# Patient Record
Sex: MALE | Race: WHITE | NOT HISPANIC OR LATINO | URBAN - METROPOLITAN AREA
[De-identification: names, ages, dates, MRNs, and addresses within clinical notes are randomized per-mention and may not be internally consistent; named-entity substitution may affect disease eponyms.]

---

## 2022-11-16 ENCOUNTER — APPOINTMENT (EMERGENCY)
Dept: CT IMAGING | Facility: HOSPITAL | Age: 57
End: 2022-11-16

## 2022-11-16 ENCOUNTER — HOSPITAL ENCOUNTER (INPATIENT)
Facility: HOSPITAL | Age: 57
LOS: 1 days | Discharge: HOME | End: 2022-11-17
Attending: EMERGENCY MEDICINE | Admitting: SURGERY

## 2022-11-16 DIAGNOSIS — K56.609 SBO (SMALL BOWEL OBSTRUCTION) (HCC): Primary | ICD-10-CM

## 2022-11-16 DIAGNOSIS — R10.9 ABDOMINAL PAIN: ICD-10-CM

## 2022-11-16 LAB
ALBUMIN SERPL BCP-MCNC: 3.9 G/DL (ref 3.5–5)
ALP SERPL-CCNC: 107 U/L (ref 46–116)
ALT SERPL W P-5'-P-CCNC: 43 U/L (ref 12–78)
ANION GAP SERPL CALCULATED.3IONS-SCNC: 9 MMOL/L (ref 4–13)
BASOPHILS # BLD AUTO: 0.03 THOUSANDS/ÂΜL (ref 0–0.1)
BASOPHILS NFR BLD AUTO: 0 % (ref 0–1)
BILIRUB SERPL-MCNC: 0.8 MG/DL (ref 0.2–1)
BUN SERPL-MCNC: 21 MG/DL (ref 5–25)
CALCIUM SERPL-MCNC: 9.5 MG/DL (ref 8.3–10.1)
CHLORIDE SERPL-SCNC: 100 MMOL/L (ref 96–108)
CO2 SERPL-SCNC: 27 MMOL/L (ref 21–32)
CREAT SERPL-MCNC: 1.29 MG/DL (ref 0.6–1.3)
EOSINOPHIL # BLD AUTO: 0.06 THOUSAND/ÂΜL (ref 0–0.61)
EOSINOPHIL NFR BLD AUTO: 1 % (ref 0–6)
ERYTHROCYTE [DISTWIDTH] IN BLOOD BY AUTOMATED COUNT: 12.9 % (ref 11.6–15.1)
GFR SERPL CREATININE-BSD FRML MDRD: 62 ML/MIN/1.73SQ M
GLUCOSE SERPL-MCNC: 105 MG/DL (ref 65–140)
HCT VFR BLD AUTO: 48 % (ref 36.5–49.3)
HGB BLD-MCNC: 16.1 G/DL (ref 12–17)
IMM GRANULOCYTES # BLD AUTO: 0.02 THOUSAND/UL (ref 0–0.2)
IMM GRANULOCYTES NFR BLD AUTO: 0 % (ref 0–2)
LACTATE SERPL-SCNC: 0.9 MMOL/L (ref 0.5–2)
LIPASE SERPL-CCNC: 100 U/L (ref 73–393)
LYMPHOCYTES # BLD AUTO: 2.63 THOUSANDS/ÂΜL (ref 0.6–4.47)
LYMPHOCYTES NFR BLD AUTO: 21 % (ref 14–44)
MCH RBC QN AUTO: 30.8 PG (ref 26.8–34.3)
MCHC RBC AUTO-ENTMCNC: 33.5 G/DL (ref 31.4–37.4)
MCV RBC AUTO: 92 FL (ref 82–98)
MONOCYTES # BLD AUTO: 1.06 THOUSAND/ÂΜL (ref 0.17–1.22)
MONOCYTES NFR BLD AUTO: 9 % (ref 4–12)
NEUTROPHILS # BLD AUTO: 8.55 THOUSANDS/ÂΜL (ref 1.85–7.62)
NEUTS SEG NFR BLD AUTO: 69 % (ref 43–75)
NRBC BLD AUTO-RTO: 0 /100 WBCS
PLATELET # BLD AUTO: 240 THOUSANDS/UL (ref 149–390)
PMV BLD AUTO: 10.1 FL (ref 8.9–12.7)
POTASSIUM SERPL-SCNC: 4 MMOL/L (ref 3.5–5.3)
PROT SERPL-MCNC: 8.1 G/DL (ref 6.4–8.4)
RBC # BLD AUTO: 5.23 MILLION/UL (ref 3.88–5.62)
SODIUM SERPL-SCNC: 136 MMOL/L (ref 135–147)
WBC # BLD AUTO: 12.35 THOUSAND/UL (ref 4.31–10.16)

## 2022-11-16 RX ORDER — KETOROLAC TROMETHAMINE 30 MG/ML
30 INJECTION, SOLUTION INTRAMUSCULAR; INTRAVENOUS ONCE
Status: COMPLETED | OUTPATIENT
Start: 2022-11-16 | End: 2022-11-16

## 2022-11-16 RX ADMIN — SODIUM CHLORIDE 1000 ML: 0.9 INJECTION, SOLUTION INTRAVENOUS at 21:47

## 2022-11-16 RX ADMIN — KETOROLAC TROMETHAMINE 30 MG: 30 INJECTION, SOLUTION INTRAMUSCULAR; INTRAVENOUS at 21:45

## 2022-11-16 RX ADMIN — IOHEXOL 100 ML: 350 INJECTION, SOLUTION INTRAVENOUS at 22:04

## 2022-11-17 ENCOUNTER — APPOINTMENT (INPATIENT)
Dept: RADIOLOGY | Facility: HOSPITAL | Age: 57
End: 2022-11-17

## 2022-11-17 VITALS
WEIGHT: 171 LBS | HEIGHT: 65 IN | TEMPERATURE: 97.7 F | SYSTOLIC BLOOD PRESSURE: 103 MMHG | OXYGEN SATURATION: 98 % | DIASTOLIC BLOOD PRESSURE: 55 MMHG | HEART RATE: 83 BPM | BODY MASS INDEX: 28.49 KG/M2 | RESPIRATION RATE: 17 BRPM

## 2022-11-17 PROBLEM — K56.609 SMALL BOWEL OBSTRUCTION (HCC): Status: RESOLVED | Noted: 2022-11-17 | Resolved: 2022-11-17

## 2022-11-17 PROBLEM — K56.609 SMALL BOWEL OBSTRUCTION (HCC): Status: ACTIVE | Noted: 2022-11-17

## 2022-11-17 LAB
ANION GAP SERPL CALCULATED.3IONS-SCNC: 9 MMOL/L (ref 4–13)
ATRIAL RATE: 56 BPM
BASOPHILS # BLD AUTO: 0.04 THOUSANDS/ÂΜL (ref 0–0.1)
BASOPHILS NFR BLD AUTO: 0 % (ref 0–1)
BUN SERPL-MCNC: 18 MG/DL (ref 5–25)
CALCIUM SERPL-MCNC: 8.5 MG/DL (ref 8.3–10.1)
CHLORIDE SERPL-SCNC: 105 MMOL/L (ref 96–108)
CO2 SERPL-SCNC: 25 MMOL/L (ref 21–32)
CREAT SERPL-MCNC: 1.09 MG/DL (ref 0.6–1.3)
EOSINOPHIL # BLD AUTO: 0.09 THOUSAND/ÂΜL (ref 0–0.61)
EOSINOPHIL NFR BLD AUTO: 1 % (ref 0–6)
ERYTHROCYTE [DISTWIDTH] IN BLOOD BY AUTOMATED COUNT: 13.1 % (ref 11.6–15.1)
GFR SERPL CREATININE-BSD FRML MDRD: 76 ML/MIN/1.73SQ M
GLUCOSE SERPL-MCNC: 100 MG/DL (ref 65–140)
HCT VFR BLD AUTO: 44 % (ref 36.5–49.3)
HGB BLD-MCNC: 14.5 G/DL (ref 12–17)
IMM GRANULOCYTES # BLD AUTO: 0.03 THOUSAND/UL (ref 0–0.2)
IMM GRANULOCYTES NFR BLD AUTO: 0 % (ref 0–2)
LYMPHOCYTES # BLD AUTO: 2.21 THOUSANDS/ÂΜL (ref 0.6–4.47)
LYMPHOCYTES NFR BLD AUTO: 21 % (ref 14–44)
MAGNESIUM SERPL-MCNC: 2 MG/DL (ref 1.6–2.6)
MCH RBC QN AUTO: 30.8 PG (ref 26.8–34.3)
MCHC RBC AUTO-ENTMCNC: 33 G/DL (ref 31.4–37.4)
MCV RBC AUTO: 93 FL (ref 82–98)
MONOCYTES # BLD AUTO: 1.09 THOUSAND/ÂΜL (ref 0.17–1.22)
MONOCYTES NFR BLD AUTO: 10 % (ref 4–12)
NEUTROPHILS # BLD AUTO: 7.09 THOUSANDS/ÂΜL (ref 1.85–7.62)
NEUTS SEG NFR BLD AUTO: 68 % (ref 43–75)
NRBC BLD AUTO-RTO: 0 /100 WBCS
P AXIS: 53 DEGREES
PHOSPHATE SERPL-MCNC: 4.7 MG/DL (ref 2.7–4.5)
PLATELET # BLD AUTO: 204 THOUSANDS/UL (ref 149–390)
PMV BLD AUTO: 10.2 FL (ref 8.9–12.7)
POTASSIUM SERPL-SCNC: 3.8 MMOL/L (ref 3.5–5.3)
PR INTERVAL: 168 MS
QRS AXIS: 26 DEGREES
QRSD INTERVAL: 84 MS
QT INTERVAL: 432 MS
QTC INTERVAL: 416 MS
RBC # BLD AUTO: 4.71 MILLION/UL (ref 3.88–5.62)
SODIUM SERPL-SCNC: 139 MMOL/L (ref 135–147)
T WAVE AXIS: 37 DEGREES
VENTRICULAR RATE: 56 BPM
WBC # BLD AUTO: 10.55 THOUSAND/UL (ref 4.31–10.16)

## 2022-11-17 RX ORDER — KETOROLAC TROMETHAMINE 30 MG/ML
15 INJECTION, SOLUTION INTRAMUSCULAR; INTRAVENOUS EVERY 6 HOURS PRN
Status: DISCONTINUED | OUTPATIENT
Start: 2022-11-17 | End: 2022-11-17 | Stop reason: HOSPADM

## 2022-11-17 RX ORDER — SODIUM CHLORIDE 9 MG/ML
50 INJECTION, SOLUTION INTRAVENOUS CONTINUOUS
Status: DISCONTINUED | OUTPATIENT
Start: 2022-11-17 | End: 2022-11-17 | Stop reason: HOSPADM

## 2022-11-17 RX ORDER — HEPARIN SODIUM 5000 [USP'U]/ML
5000 INJECTION, SOLUTION INTRAVENOUS; SUBCUTANEOUS EVERY 8 HOURS SCHEDULED
Status: DISCONTINUED | OUTPATIENT
Start: 2022-11-17 | End: 2022-11-17 | Stop reason: HOSPADM

## 2022-11-17 RX ORDER — ONDANSETRON 2 MG/ML
4 INJECTION INTRAMUSCULAR; INTRAVENOUS EVERY 6 HOURS PRN
Status: DISCONTINUED | OUTPATIENT
Start: 2022-11-17 | End: 2022-11-17 | Stop reason: HOSPADM

## 2022-11-17 RX ORDER — HYDROMORPHONE HCL/PF 1 MG/ML
0.5 SYRINGE (ML) INJECTION EVERY 4 HOURS PRN
Status: DISCONTINUED | OUTPATIENT
Start: 2022-11-17 | End: 2022-11-17 | Stop reason: HOSPADM

## 2022-11-17 RX ORDER — SODIUM CHLORIDE 9 MG/ML
150 INJECTION, SOLUTION INTRAVENOUS CONTINUOUS
Status: DISPENSED | OUTPATIENT
Start: 2022-11-17 | End: 2022-11-17

## 2022-11-17 RX ADMIN — SODIUM CHLORIDE 150 ML/HR: 0.9 INJECTION, SOLUTION INTRAVENOUS at 00:21

## 2022-11-17 RX ADMIN — IOHEXOL 100 ML: 350 INJECTION, SOLUTION INTRAVENOUS at 14:04

## 2022-11-17 RX ADMIN — KETOROLAC TROMETHAMINE 15 MG: 30 INJECTION, SOLUTION INTRAMUSCULAR; INTRAVENOUS at 06:20

## 2022-11-17 NOTE — PLAN OF CARE
Problem: Potential for Falls  Goal: Patient will remain free of falls  Description: INTERVENTIONS:  - Educate patient/family on patient safety including physical limitations  - Instruct patient to call for assistance with activity   - Consult OT/PT to assist with strengthening/mobility   - Keep Call bell within reach  - Keep bed low and locked with side rails adjusted as appropriate  - Keep care items and personal belongings within reach  - Initiate and maintain comfort rounds  - Make Fall Risk Sign visible to staff  - Offer Toileting every  2Hours, in advance of need  - Initiate/Maintain 2alarm  - Obtain necessary fall risk management equipment: 2  - Apply yellow socks and bracelet for high fall risk patients  - Consider moving patient to room near nurses station  Outcome: Progressing

## 2022-11-17 NOTE — ED NOTES
Patient refused breakfast, would prefer to wait and order lunch       Cayden Garcia RN  11/17/22 1147

## 2022-11-17 NOTE — H&P
H&P Exam - General Surgery   Carmel Valero 49 y o  male MRN: [id number]  Unit/Bed#: ED 11 Encounter: [id number]    Assessment/Plan     Assessment/Plan:    Small bowel obstruction:  - past surgical history of appendectomy in 1980  Mina's procedure with subsequent reversal in 2016 due to perforated diverticulitis performed at [facility]   - on exam, hypoactive bowel sounds  Abdomen is soft, mild tenderness to palpation on the left upper quadrant  Mild distention  Not peritoneal    - white count 12 35  Lactate within normal limits  - CT scan reviewed  Dilated small bowel with decompressed small bowel distally, transition point located in left abdomen  There is fecalization of the small bowel proximal to transition point  Small amount of free fluid in pelvis, no free air identified  - plan for admission to general surgery service for bowel rest and monitoring return of bowel function  Currently will hold off on NGT as pt is not symptomatic and stomach is relatively decompressed on scan, place if nausea/vomiting   - monitor abdominal exam  - discussed with patient that if vitals/labs are worsening or his clinical picture fails to resolve, he may require exploration in the OR due to his obstruction and extent of adhesions  Patient expresses understanding   - at this time, there is no emergent surgical pathology    Leukocytosis:  - white count of 12 35 on admission, no other signs of SIRS  - AVSS  - lactic acid WNL, 0 9  - likely reactive 2/2 SBO, trend white count and vitals off antibiotics      History of Present Illness     HPI:  Carmel Valero is a 49 y o  male with a past history significant for an appendectomy in 1980 and Mina's procedure with subsequent reversal in 2016 due to perforated diverticulitis who presents with abdominal pain and bloating that started 24 hours ago  Since onset, patient states he has been passing some gas and had 1 small bowel movement    States he currently is not passing gas  To not take any medications prior to arrival   Currently pain is 4/10, on arrival to the ED it was 8/10  He states that pain medication in the ED help alleviate some of the pain  He is unable to identify any aggravating factors  He currently denies any fevers, chills, nausea, vomiting, urinary symptoms, chest pain, shortness a breath  He denies any previous episodes  Denies any daily medication intake aside from vitamins  Denies any known allergies  States he had an episode of perforated diverticulitis in 2016 that required sigmoid colon resection and colostomy creation  States this was reversed also in 2016, surgeries performed at [facility]   States he had an appendectomy back in 38  He had a colonoscopy prior to Mina's reversal, recall of 10 years  Review of Systems   Constitutional: Negative for chills and fever  Eyes: Negative for pain and visual disturbance  Respiratory: Negative for cough and shortness of breath  Cardiovascular: Negative for chest pain and palpitations  Gastrointestinal: Positive for abdominal distention and abdominal pain  Negative for constipation, diarrhea, nausea and vomiting  Genitourinary: Negative for dysuria and hematuria  Musculoskeletal: Negative for arthralgias and back pain  Skin: Negative for color change and rash  Neurological: Negative for syncope and headaches  All other systems reviewed and are negative  Historical Information   Past Medical History:   Diagnosis Date   • Diverticulitis 2016   • Perforated sigmoid colon ([facility] [address] ) 2016     Past Surgical History:   Procedure Laterality Date   • COLOSTOMY  08/2016    had colostomy reversal sept 2016       Social History   Social History     Substance and Sexual Activity   Alcohol Use Not Currently     Social History     Substance and Sexual Activity   Drug Use Never     Social History     Tobacco Use   Smoking Status Never   Smokeless Tobacco Never E-Cigarette/Vaping   • E-Cigarette Use Never User      E-Cigarette/Vaping Substances     Family History: History reviewed  No pertinent family history  Meds/Allergies   PTA meds:   None     No Known Allergies    Objective   First Vitals:   Blood Pressure: 156/84 (11/16/22 2023)  Pulse: 83 (11/16/22 2023)  Temperature: (!) 97 4 °F (36 3 °C) (11/16/22 2023)  Temp Source: Oral (11/16/22 2023)  Respirations: 16 (11/16/22 2023)  Height: 5' 5" (165 1 cm) (11/16/22 2023)  Weight - Scale: 77 6 kg (171 lb) (11/16/22 2023)  SpO2: 95 % (11/16/22 2023)    Current Vitals:   Blood Pressure: 138/76 (11/16/22 2200)  Pulse: 82 (11/16/22 2200)  Temperature: (!) 97 4 °F (36 3 °C) (11/16/22 2023)  Temp Source: Oral (11/16/22 2023)  Respirations: 16 (11/16/22 2130)  Height: 5' 5" (165 1 cm) (11/16/22 2023)  Weight - Scale: 77 6 kg (171 lb) (11/16/22 2023)  SpO2: 91 % (11/16/22 2200)    No intake or output data in the 24 hours ending 11/16/22 2329    Invasive Devices     Peripheral Intravenous Line  Duration           Peripheral IV 11/16/22 Left Antecubital <1 day                Physical Exam  Vitals and nursing note reviewed  Constitutional:       General: He is not in acute distress  Appearance: Normal appearance  He is well-developed  He is not ill-appearing  HENT:      Head: Normocephalic and atraumatic  Eyes:      General: No scleral icterus  Extraocular Movements: Extraocular movements intact  Conjunctiva/sclera: Conjunctivae normal    Cardiovascular:      Rate and Rhythm: Normal rate and regular rhythm  Heart sounds: No murmur heard  Pulmonary:      Effort: Pulmonary effort is normal  No respiratory distress  Breath sounds: Normal breath sounds  Abdominal:      General: There is distension  Palpations: Abdomen is soft  Tenderness: There is abdominal tenderness  There is no guarding or rebound  Comments: Hypoactive bowel sounds  Abdomen is soft    Tenderness to palpation in left upper quadrant  Abdomen is mildly distended  Musculoskeletal:         General: No swelling  Cervical back: Neck supple  Skin:     General: Skin is warm and dry  Capillary Refill: Capillary refill takes less than 2 seconds  Neurological:      General: No focal deficit present  Mental Status: He is alert  Psychiatric:         Mood and Affect: Mood normal          Lab Results:   I have personally reviewed pertinent lab results  , CBC:   Lab Results   Component Value Date    WBC 12 35 (H) 11/16/2022    HGB 16 1 11/16/2022    HCT 48 0 11/16/2022    MCV 92 11/16/2022     11/16/2022    MCH 30 8 11/16/2022    MCHC 33 5 11/16/2022    RDW 12 9 11/16/2022    MPV 10 1 11/16/2022    NRBC 0 11/16/2022   , CMP:   Lab Results   Component Value Date    SODIUM 136 11/16/2022    K 4 0 11/16/2022     11/16/2022    CO2 27 11/16/2022    BUN 21 11/16/2022    CREATININE 1 29 11/16/2022    CALCIUM 9 5 11/16/2022    AST  11/16/2022      Comment:      No Result; if an AST is required for patient care, it must be ordered separately  Specimen collection should occur prior to Sulfasalazine administration due to the potential for falsely depressed results  ALT 43 11/16/2022    ALKPHOS 107 11/16/2022    EGFR 62 11/16/2022   , Lipase:   Lab Results   Component Value Date    LIPASE 100 11/16/2022     Imaging: I have personally reviewed pertinent reports  EKG, Pathology, and Other Studies: I have personally reviewed pertinent reports        Code Status: No Order  Advance Directive and Living Will:      Power of :    POLST:      Kimberli Burden

## 2022-11-17 NOTE — PROGRESS NOTES
Progress Note - Elysia Geiger 49 y o  male MRN: [id number]    Unit/Bed#: ED 6 Encounter: [id number]      Assessment/Plan:-  SBO  -Pt with prior surgical hx of appendectomy and Mina's procedure with reversal in 2016 2/2 perforated diverticulitis in 2016  -CT with dilated SB loops, mainly in Left abdomen  -Pt reports he had a small BM this morning  -Denies Nausea or vomiting  Trend abdominal exam  -Contrast study this am to evaluate    Leukocytosis  -improved this am        Subjective:   Reports he had a small BM this morning  Pain is minimal in left lower abdomen  No nausea or vomiting    Objective:     Vitals: Blood pressure 152/72, pulse 61, temperature 98 1 °F (36 7 °C), temperature source Oral, resp  rate 16, height 5' 5" (1 651 m), weight 77 6 kg (171 lb), SpO2 98 %  ,Body mass index is 28 46 kg/m²  Intake/Output Summary (Last 24 hours) at 11/17/2022 0950  Last data filed at 11/17/2022 7299  Gross per 24 hour   Intake 895 ml   Output --   Net 895 ml       Physical Exam:   General Appearance: NAD, cooperative, alert  Eyes: Anicteric, conjunctiva clear  HENT:  Normocephalic, atraumatic, normal mucosa  external ears normal, external nose normal, no drainage  Neck:    Supple, symmetrical, trachea midline  Resp:  Clear to auscultation bilaterally; no rales, rhonchi or wheezing; respirations unlabored   CV:  S1 S2, Regular rate and rhythm; no murmur, rub, or gallop  GI:  Soft, non-distended; normal bowel sounds; no masses, no organomegaly  Prior scars  Minimal tenderness to palpation in Left lower quadrant  No guarding  Musculoskeletal: No cyanosis, clubbing or edema  Normal ROM    Skin:   No jaundice, rashes, or lesions   Psych: Normal affect, good eye contact  Neuro: No gross deficits, AAOx3, speech nl, egan      Invasive Devices     Peripheral Intravenous Line  Duration           Peripheral IV 11/16/22 Left Antecubital <1 day                Lab, Imaging and other studies: I have personally reviewed pertinent reports      VTE Pharmacologic Prophylaxis: Heparin  VTE Mechanical Prophylaxis: sequential compression device

## 2022-11-17 NOTE — CASE MANAGEMENT
Case Management Assessment & Discharge Planning Note    Patient name Lynnae Boeck  Location ED 11ED 11 MRN [id number]  : 1965 Date 2022       Current Admission Date: 2022  Current Admission Diagnosis:Small bowel obstruction [facility])   Patient Active Problem List    Diagnosis Date Noted   • Small bowel obstruction ([address] ) 2022      LOS (days): 1  Geometric Mean LOS (GMLOS) (days):   Days to GMLOS:     OBJECTIVE:    Risk of Unplanned Readmission Score: 5 59         Current admission status: Inpatient       Preferred Pharmacy:   [facility] MICK Zapata  [address]  [address]  [place] [address]  Phone: [phone] Fax: [phone]    Primary Care Provider: No primary care provider on file  Primary Insurance: [address],[facility]IELD  Secondary Insurance:     ASSESSMENT:  Active Health Care Proxies    There are no active Health Care Proxies on file  Advance Directives  Does patient have a [address]?: No  Was patient offered paperwork?: Yes (declined)  Does patient currently have a Health Care decision maker?: Yes, please see Health Care Proxy section  Does patient have Advance Directives?: No (declined)  Was patient offered paperwork?: Yes  Primary Contact: Sallie Mendoza         Readmission Root Cause  30 Day Readmission: No    Patient Information  Admitted from[de-identified] Home  Mental Status: Alert  During Assessment patient was accompanied by: Not accompanied during assessment  Assessment information provided by[de-identified] Patient  Primary Caregiver: Self  Support Systems: Self, Spouse/significant other  [place] of Residence: [address] [place] [address] do you live in?: [address] [place] [address] entry access options   Select all that apply : Stairs  Number of steps to enter home : One Flight (15)  Do the steps have railings?: Yes  Type of Current Residence: Apartment  Floor Level: 2  Upon entering residence, is there a bedroom on the main floor (no further steps)?: Yes  Upon entering residence, is there a bathroom on the main floor (no further steps)?: Yes  In the last 12 months, was there a time when you were not able to pay the mortgage or rent on time?: No  In the last 12 months, how many places have you lived?: 1  In the last 12 months, was there a time when you did not have a steady place to sleep or slept in a shelter (including now)?: No  Homeless/housing insecurity resource given?: N/A  Living Arrangements: Lives w/ Spouse/significant other  Is patient a ?: No    Activities of Daily Living Prior to Admission  Functional Status: Independent  Completes ADLs independently?: Yes  Ambulates independently?: Yes  Does patient use assisted devices?: No  Does patient currently own DME?: No  Does patient have a history of Outpatient Therapy (PT/OT)?: No  Does the patient have a history of Short-Term Rehab?: No  Does patient have a history of HHC?: Yes  Does patient currently have [facility]?: No         Patient Information Continued  Income Source: Employed  Does patient have prescription coverage?: Yes  Within the past 12 months, you worried that your food would run out before you got the money to buy more : Never true  Within the past 12 months, the food you bought just didn't last and you didn't have money to get more : Never true  Food insecurity resource given?: N/A  Does patient receive dialysis treatments?: No  Does patient have a history of substance abuse?: No  Does patient have a history of Mental Health Diagnosis?: No         Means of Transportation  Means of Transport to Appts[de-identified] Drives Self  In the past 12 months, has lack of transportation kept you from medical appointments or from getting medications?: No  In the past 12 months, has lack of transportation kept you from meetings, work, or from getting things needed for daily living?: No  Was application for public transport provided?: N/A        DISCHARGE DETAILS:    Discharge planning discussed with[de-identified] patient  Freedom of Choice: Yes  Comments - Freedom of Choice: discussed  CM contacted family/caregiver?: No- see comments (pt alert and indpt in room)  Were Treatment Team discharge recommendations reviewed with patient/caregiver?: Yes  Did patient/caregiver verbalize understanding of patient care needs?: Yes       Contacts  Reason/Outcome: Discharge [address]         Is the patient interested in [facility] at discharge?: No    DME Referral Provided  Referral made for DME?: No    Other Referral/Resources/Interventions Provided:  Interventions: None Indicated  Referral Comments: Pending any surgical plans no need anticiapted for dc home            Discharge Destination Plan[de-identified] Home  Transport at Discharge : Family                                      Additional Comments: Cm met with pt in ED  Pt reports that he resides in a 2nd floor apartment with his wife  There are about 15 berna the apartment  Pt does not use or own any DME  He has been walking indpt in the ED to and from the bathroom  Pt has no hx of STR/MH/DA/PT/OT  Pt has had [facility] before when he had a colostomy  He works fulltime and drives  Pt sees [facility] PCP and uses [employer]  Spouse will provide transportation home   Needs TBD

## 2022-11-17 NOTE — QUICK NOTE
Patient tolerated lunch without difficulties  States he had very minimal pain associated with eating, otherwise denies any nausea or vomiting  He states he has been having several bowel movements today  Discussed with surgeon, Dr Nathaly Tovar, patient cleared for discharge home  Advised patient to follow up with primary care physician  All questions addressed       Kimberli Burden

## 2022-11-17 NOTE — TREATMENT PLAN
Pt discussed with PA on call and CT personally reviewed  Pt does not have peritoneal signs per PA  PSBO with small bowel fecalization on CT  Labs relatively normal at this point  LA pending  NGT if vomiting   NPO  IV overnight with careful trending        Following carefully   See full note by PA>     Signature:   Richie John MD  Date: 11/16/2022 Time: 11:30 PM

## 2022-11-17 NOTE — UTILIZATION REVIEW
Initial Clinical Review    Admission: Date/Time/Statement:   Admission Orders (From admission, onward)     Ordered        11/16/22 2338  INPATIENT ADMISSION  Once                      Orders Placed This Encounter   Procedures   • INPATIENT ADMISSION     Standing Status:   Standing     Number of Occurrences:   1     Order Specific Question:   Level of Care     Answer:   Med Surg [16]     Order Specific Question:   Estimated length of stay     Answer:   More than 2 Midnights     Order Specific Question:   Certification     Answer:   I certify that inpatient services are medically necessary for this patient for a duration of greater than two midnights  See H&P and MD Progress Notes for additional information about the patient's course of treatment  ED Arrival Information     Expected   -    Arrival   11/16/2022 20:11    Acuity   Urgent            Means of arrival   Walk-In    Escorted by   Family Member    Service   Surgery-General    Admission type   Emergency            Arrival complaint   :Abd pain, left side           Chief Complaint   Patient presents with   • Abdominal Pain       Initial Presentation: 49 y o  male from home to ED admitted inpatient due to Small Bowel Obstruction  Presented due to abdominal pain starting night prior to arrival   + nausea  On exam:  Abdominal surgical scar  Generalized abdominal tenderness  Hypoactive bowel sounds  Wbc 12 35  Ct abdomen showed small bowel obstruction  In the ED given 1 liter IVF, Toradol  Plan includes: Bowel rest, NPO, continued IVF, analgesia and antiemetics as needed  Date: 11/17/22    Day 2:  This am small BM  Pain minimal in left lower abdomen  On exam abdomen soft  Prior scars  Minimal tenderness to palpation in LLQ  Plan is contrast study today  Continued IVF, analgesia as needed       ED Triage Vitals [11/16/22 2023]   Temperature Pulse Respirations Blood Pressure SpO2   (!) 97 4 °F (36 3 °C) 83 16 156/84 95 %      Temp Source Heart Rate Source Patient Position - Orthostatic VS BP Location FiO2 (%)   Oral Monitor Sitting Left arm --      Pain Score       8          Wt Readings from Last 1 Encounters:   11/16/22 77 6 kg (171 lb)     Additional Vital Signs:   11/17/22 1045 -- 83 16 129/60 87 98 % None (Room air) Sitting   11/17/22 0730 -- 61 16 152/72 103 98 % None (Room air) Sitting   11/17/22 0600 -- 79 20 144/77 101 97 % None (Room air) Sitting   11/17/22 0530 98 1 °F (36 7 °C) 63 16 117/63 85 96 % None (Room air) Sitting   11/17/22 0400 -- 66 16 133/75 96 94 % None (Room air) Sitting   11/17/22 0200 -- 60 -- 116/58 81 96 % -- --   11/17/22 0100 -- 65 -- 108/75 88 96 % -- --   11/17/22 0000 -- 68 -- 117/73 90 99 % -- --   11/16/22 2300 -- 68 -- 115/65 85 96 % -- --   11/16/22 2200 -- 82 -- 138/76 102 91 % None (Room air)      Pertinent Labs/Diagnostic Test Results:   XR abdomen obstruction series   Final Result by Abrahan Galicia MD (11/17 1525)      Nonobstructive bowel gas pattern  Oral contrast has progressed to the level of the rectum  Workstation performed: [id number]         CT abdomen pelvis with contrast   Final Result by Sabine Lane MD (11/16 2222)      Multiple abnormally dilated loops of small bowel at the left mid to lower abdomen with relative collapse of the mid to distal small bowel most compatible with a small bowel obstruction  A transition point is noted within the left lower quadrant  Surgical consultation is advised  Small amount of free fluid within the left lower quadrant and pelvis  No free intraperitoneal air                  I personally discussed this study with SUSANNE HILL on 11/16/2022 at 10:20 PM                   Workstation performed: [id number]             Results from last 7 days   Lab Units 11/17/22  0426 11/16/22 2031   WBC Thousand/uL 10 55* 12 35*   HEMOGLOBIN g/dL 14 5 16 1   HEMATOCRIT % 44 0 48 0   PLATELETS Thousands/uL 204 240   NEUTROS ABS Thousands/µL 7 09 8 55* Results from last 7 days   Lab Units 11/17/22 0426 11/16/22 2031   SODIUM mmol/L 139 136   POTASSIUM mmol/L 3 8 4 0   CHLORIDE mmol/L 105 100   CO2 mmol/L 25 27   ANION GAP mmol/L 9 9   BUN mg/dL 18 21   CREATININE mg/dL 1 09 1 29   EGFR ml/min/1 73sq m 76 62   CALCIUM mg/dL 8 5 9 5   MAGNESIUM mg/dL 2 0  --    PHOSPHORUS mg/dL 4 7*  --      Results from last 7 days   Lab Units 11/16/22 2031   ALT U/L 43   ALK PHOS U/L 107   TOTAL PROTEIN g/dL 8 1   ALBUMIN g/dL 3 9   TOTAL BILIRUBIN mg/dL 0 80     Results from last 7 days   Lab Units 11/17/22 0426 11/16/22 2031   GLUCOSE RANDOM mg/dL 100 105     Results from last 7 days   Lab Units 11/16/22  2312   LACTIC ACID mmol/L 0 9     Results from last 7 days   Lab Units 11/16/22 2031   LIPASE u/L 100       ED Treatment:   Medication Administration from 11/16/2022 2009 to 11/17/2022 1107       Date/Time Order Dose Route Action Comments     11/16/2022 2147 EST sodium chloride 0 9 % bolus 1,000 mL 1,000 mL Intravenous New Bag --     11/16/2022 2145 EST ketorolac (TORADOL) injection 30 mg 30 mg Intravenous Given --     11/17/2022 0021 EST sodium chloride 0 9 % infusion 150 mL/hr Intravenous New Bag --     11/17/2022 3850 EST sodium chloride 0 9 % infusion 125 mL/hr Intravenous Rate/Dose Change --     11/17/2022 7892 EST ketorolac (TORADOL) injection 15 mg 15 mg Intravenous Given --        Past Medical History:   Diagnosis Date   • Diverticulitis 2016   • Perforated sigmoid colon ([facility] [address] ) 2016     Present on Admission:  • Small bowel obstruction ([facility] [address] )      Admitting Diagnosis: SBO (small bowel obstruction) ([facility] [address] ) [K56 609]  Abdominal pain [R10 9]  Age/Sex: 49 y o  male  Admission Orders: 11/16/22 2338 inpatient   Scheduled Medications:  heparin (porcine), 5,000 Units, Subcutaneous, Q8H [address]      Continuous IV Infusions:  sodium chloride, 125 mL/hr, Intravenous, Continuous    sodium chloride 0 9 % infusion  Rate: 150 mL/hr Dose: 150 mL/hr  Freq: Continuous Route: IV  Indications of Use: IV Hydration  Last Dose: Stopped (11/17/22 0619)  Start: 11/17/22 0015 End: 11/17/22 0720    PRN Meds:  HYDROmorphone, 0 5 mg, Intravenous, Q4H PRN  ketorolac, 15 mg, Intravenous, Q6H PRN - used x 1  11/17/22   morphine injection, 2 mg, Intravenous, Q4H PRN  ondansetron, 4 mg, Intravenous, Q6H PRN    NPO    Network Utilization Review Department  ATTENTION: Please call with any questions or concerns to [phone] and carefully listen to the prompts so that you are directed to the right person  All voicemails are confidential   Yahaira Grand all requests for admission clinical reviews, approved or denied determinations and any other requests to dedicated fax number below belonging to the campus where the patient is receiving treatment   List of dedicated fax numbers for the Facilities:  [address] DENIALS (Administrative/Medical Necessity) [phone]   [address] (Maternity/NICU/Pediatrics) [phone]   [address]iselda Pal [phone]   [facility] 77 [phone]   [address] [place] [address] Medical [place] [place] [address] [address] Rodriguez 28 [phone]   [facility] Balwinder Negron [facility] 134 815 [facility] [phone]

## 2022-11-17 NOTE — ED TRIAGE NOTES
Pt arrives ambulatory to the ED from home with c/o LLQ "pressure" and pain that started yesterday  Denies OTC medication  Hx diverticulitis and perforated colon, states current symptoms feel the same

## 2022-11-17 NOTE — ED PROVIDER NOTES
History  Chief Complaint   Patient presents with   • Abdominal Pain     This is a 49 YOM with previous diverticulitis with perforation who presents to the ED with abdominal pain that started last night  Patient states his pain has been constant since onset, describes it as dull ache  He states his pain is primarily left side of his abdomen but radiates to the right side as well  States the pain has been increasing since initial onset  He has not tried anything for the pain at home  He denies any recent fevers, chills, headaches, lightheadedness, chest pain, SOB, nausea, vomiting, diarrhea or stool changes, urinary burning/increased frequency/hematuria  Has a previous surgical history of sigmoid colon resection  None       Past Medical History:   Diagnosis Date   • Diverticulitis 2016   • Perforated sigmoid colon ([facility] [address] ) 2016       Past Surgical History:   Procedure Laterality Date   • COLOSTOMY  08/2016    had colostomy reversal sept 2016  History reviewed  No pertinent family history  I have reviewed and agree with the history as documented  E-Cigarette/Vaping   • E-Cigarette Use Never User      E-Cigarette/Vaping Substances     Social History     Tobacco Use   • Smoking status: Never   • Smokeless tobacco: Never   Vaping Use   • Vaping Use: Never used   Substance Use Topics   • Alcohol use: Not Currently   • Drug use: Never       Review of Systems   Constitutional: Negative for chills and fever  HENT: Negative  Eyes: Negative for photophobia and visual disturbance  Respiratory: Negative for cough and shortness of breath  Cardiovascular: Negative for chest pain  Gastrointestinal: Positive for abdominal pain and nausea (Reports mild nausea)  Negative for diarrhea and vomiting  Genitourinary: Negative for difficulty urinating and dysuria  Musculoskeletal: Negative for arthralgias  Skin: Negative for rash     Neurological: Negative for dizziness, syncope, light-headedness and headaches  Physical Exam  Physical Exam  Vitals and nursing note reviewed  Constitutional:       General: He is not in acute distress  Appearance: He is well-developed  He is not ill-appearing  HENT:      Head: Normocephalic and atraumatic  Eyes:      Conjunctiva/sclera: Conjunctivae normal    Cardiovascular:      Rate and Rhythm: Normal rate and regular rhythm  Heart sounds: No murmur heard  Pulmonary:      Effort: Pulmonary effort is normal  No respiratory distress  Breath sounds: Normal breath sounds  Abdominal:      General: A surgical scar is present  Palpations: Abdomen is soft  Tenderness: There is generalized abdominal tenderness  There is no right CVA tenderness or left CVA tenderness  Musculoskeletal:         General: No swelling  Cervical back: Neck supple  Skin:     General: Skin is warm and dry  Capillary Refill: Capillary refill takes less than 2 seconds  Neurological:      General: No focal deficit present  Mental Status: He is alert and oriented to person, place, and time     Psychiatric:         Mood and Affect: Mood normal          Vital Signs  ED Triage Vitals [11/16/22 2023]   Temperature Pulse Respirations Blood Pressure SpO2   (!) 97 4 °F (36 3 °C) 83 16 156/84 95 %      Temp Source Heart Rate Source Patient Position - Orthostatic VS BP Location FiO2 (%)   Oral Monitor Sitting Left arm --      Pain Score       8           Vitals:    11/16/22 2023 11/16/22 2115 11/16/22 2130 11/16/22 2200   BP: 156/84 164/79 159/88 138/76   Pulse: 83 71 84 82   Patient Position - Orthostatic VS: Sitting            Visual Acuity      ED Medications  Medications   sodium chloride 0 9 % bolus 1,000 mL (0 mL Intravenous Stopped 11/16/22 2337)   ketorolac (TORADOL) injection 30 mg (30 mg Intravenous Given 11/16/22 2145)   iohexol (OMNIPAQUE) 350 MG/ML injection (SINGLE-DOSE) 100 mL (100 mL Intravenous Given 11/16/22 2204)       Diagnostic Studies  Results Reviewed     Procedure Component Value Units Date/Time    Lactic acid, plasma [[id number]]  (Normal) Collected: 11/16/22 2312    Lab Status: Final result Specimen: Blood from Arm, Left Updated: 11/16/22 2339     LACTIC ACID 0 9 mmol/L     Narrative:      Result may be elevated if tourniquet was used during collection      Comprehensive metabolic panel [[id number]] Collected: 11/16/22 2031    Lab Status: Final result Specimen: Blood from Arm, Right Updated: 11/16/22 2055     Sodium 136 mmol/L      Potassium 4 0 mmol/L      Chloride 100 mmol/L      CO2 27 mmol/L      ANION GAP 9 mmol/L      BUN 21 mg/dL      Creatinine 1 29 mg/dL      Glucose 105 mg/dL      Calcium 9 5 mg/dL      AST --     ALT 43 U/L      Alkaline Phosphatase 107 U/L      Total Protein 8 1 g/dL      Albumin 3 9 g/dL      Total Bilirubin 0 80 mg/dL      eGFR 62 ml/min/1 73sq m     Narrative:      [place] guidelines for Chronic Kidney Disease (CKD):   •  Stage 1 with normal or high GFR (GFR > 90 mL/min/1 73 square meters)  •  Stage 2 Mild CKD (GFR = 60-89 mL/min/1 73 square meters)  •  Stage 3A Moderate CKD (GFR = 45-59 mL/min/1 73 square meters)  •  Stage 3B Moderate CKD (GFR = 30-44 mL/min/1 73 square meters)  •  Stage 4 Severe CKD (GFR = 15-29 mL/min/1 73 square meters)  •  Stage 5 End Stage CKD (GFR <15 mL/min/1 73 square meters)  Note: GFR calculation is accurate only with a steady state creatinine    Lipase [[id number]]  (Normal) Collected: 11/16/22 2031    Lab Status: Final result Specimen: Blood from Arm, Right Updated: 11/16/22 2053     Lipase 100 u/L     CBC and differential [[id number]]  (Abnormal) Collected: 11/16/22 2031    Lab Status: Final result Specimen: Blood from Arm, Right Updated: 11/16/22 2038     WBC 12 35 Thousand/uL      RBC 5 23 Million/uL      Hemoglobin 16 1 g/dL      Hematocrit 48 0 %      MCV 92 fL      MCH 30 8 pg      MCHC 33 5 g/dL      RDW 12 9 %      MPV 10 1 fL      Platelets 837 Thousands/uL nRBC 0 /100 WBCs      Neutrophils Relative 69 %      Immat GRANS % 0 %      Lymphocytes Relative 21 %      Monocytes Relative 9 %      Eosinophils Relative 1 %      Basophils Relative 0 %      Neutrophils Absolute 8 55 Thousands/µL      Immature Grans Absolute 0 02 Thousand/uL      Lymphocytes Absolute 2 63 Thousands/µL      Monocytes Absolute 1 06 Thousand/µL      Eosinophils Absolute 0 06 Thousand/µL      Basophils Absolute 0 03 Thousands/µL                  CT abdomen pelvis with contrast   Final Result by Talia Madrid MD (11/16 2222)      Multiple abnormally dilated loops of small bowel at the left mid to lower abdomen with relative collapse of the mid to distal small bowel most compatible with a small bowel obstruction  A transition point is noted within the left lower quadrant  Surgical consultation is advised  Small amount of free fluid within the left lower quadrant and pelvis  No free intraperitoneal air  I personally discussed this study with SUSANNE HILL on 11/16/2022 at 10:20 PM                   Workstation performed: [id number]                    Procedures  Procedures         ED Course  ED Course as of 11/16/22 2349   Wed Nov 16, 2022 2235    CT IMPRESSION:     Multiple abnormally dilated loops of small bowel at the left mid to lower abdomen with relative collapse of the mid to distal small bowel most compatible with a small bowel obstruction  A transition point is noted within the left lower quadrant  Surgical consultation is advised      Small amount of free fluid within the left lower quadrant and pelvis  No free intraperitoneal air  [address] surgical AP  SBIRT 20yo+    Flowsheet Row Most Recent Value   SBIRT (25 yo +)    In order to provide better care to our patients, we are screening all of our patients for alcohol and drug use  Would it be okay to ask you these screening questions?  Yes Filed at: 11/16/2022 2028 Initial Alcohol Screen: US AUDIT-C     1  How often do you have a drink containing alcohol? 0 Filed at: 11/16/2022 2028   2  How many drinks containing alcohol do you have on a typical day you are drinking? 0 Filed at: 11/16/2022 2028   3a  Male UNDER 65: How often do you have five or more drinks on one occasion? 0 Filed at: 11/16/2022 2028   3b  FEMALE Any Age, or MALE 65+: How often do you have 4 or more drinks on one occassion? 0 Filed at: 11/16/2022 2028   Audit-C Score 0 Filed at: 11/16/2022 2028   TORREY: How many times in the past year have you    Used an illegal drug or used a prescription medication for non-medical reasons? Never Filed at: 11/16/2022 2028                    MDM  Number of Diagnoses or Management Options  Abdominal pain: new and requires workup  SBO (small bowel obstruction) [facility]): new and requires workup  Diagnosis management comments: Patient presents to the ED with abdominal pain that began last night, admits to mild nausea at home, denies vomiting, reports he last had a bowel movement this morning  Labs unremarkable, CT abdomen and pelvis showed a small bowel obstruction clear transition point  Is consulted Surgical AP who recommended admission to surgery, patient made NPO         Amount and/or Complexity of Data Reviewed  Clinical lab tests: ordered and reviewed  Tests in the radiology section of CPT®: ordered and reviewed  Tests in the medicine section of CPT®: reviewed and ordered  Decide to obtain previous medical records or to obtain history from someone other than the patient: yes  Review and summarize past medical records: yes  Discuss the patient with other providers: yes  Independent visualization of images, tracings, or specimens: yes    Patient Progress  Patient progress: stable      Disposition  Final diagnoses:   SBO (small bowel obstruction) ([address] )   Abdominal pain     Time reflects when diagnosis was documented in both MDM as applicable and the Disposition within this note     Time User Action Codes Description Comment    11/16/2022 10:45 PM Alba Rosemont Add [T89 457] SBO (small bowel obstruction) ([facility] [address] )     11/16/2022 10:52 PM Alba Rosemont Add [R10 9] Abdominal pain       ED Disposition     ED Disposition   Admit    Condition   Stable    Date/Time   Wed Nov 16, 2022 11:39 PM    Comment   Case was discussed with Pieter Boland and the patient's admission status was agreed to be Admission Status: inpatient status to the service of Dr Alma Love   Follow-up Information    None         Patient's Medications    No medications on file       No discharge procedures on file      PDMP Review     None          ED Provider  Electronically Signed by           Mil Olson PA-C  11/16/22 4466

## 2022-11-18 NOTE — NURSING NOTE
Pt was discharged  IV access removed, AVS given, significant other picked up pt, pt took all of his belongings

## 2022-11-21 NOTE — UTILIZATION REVIEW
NOTIFICATION OF ADMISSION DISCHARGE   This is a Notification of Discharge from [address]  Please be advised that this patient has been discharge from our facility  Below you will find the admission and discharge date and time including the patient’s disposition  UTILIZATION REVIEW CONTACT:  Román Lynch  Utilization   Network Utilization Review Department  Phone: [phone] carefully listen to the prompts  All voicemails are confidential   Email: [email or web address]     ADMISSION INFORMATION  PRESENTATION DATE: 11/16/2022  8:58 PM  OBERVATION ADMISSION DATE:   INPATIENT ADMISSION DATE: 11/16/22 11:38 PM   DISCHARGE DATE: 11/17/2022  8:19 PM  DISPOSITION: Home/Self Care    IMPORTANT INFORMATION:  Send all requests for admission clinical reviews, approved or denied determinations and any other requests to dedicated fax number below belonging to the campus where the patient is receiving treatment   List of dedicated fax numbers:  [address] DENIALS (Administrative/Medical Necessity) [phone]   [address] (Maternity/NICU/Pediatrics) [phone]   [facility] [phone]   [place] [address] [phone]   [facility] 134 [phone]   220 [facility] [phone]   90 [facility] [phone]   [address] [place], [place] [address] [phone]   [facility]  [phone]   405 [facility] [phone]   412 [facility] 850 [facility] [phone]

## 2023-11-03 ENCOUNTER — OFFICE VISIT (OUTPATIENT)
Dept: URGENT CARE | Facility: CLINIC | Age: 58
End: 2023-11-03
Payer: COMMERCIAL

## 2023-11-03 VITALS
SYSTOLIC BLOOD PRESSURE: 138 MMHG | TEMPERATURE: 98.6 F | RESPIRATION RATE: 18 BRPM | OXYGEN SATURATION: 97 % | HEART RATE: 67 BPM | DIASTOLIC BLOOD PRESSURE: 94 MMHG

## 2023-11-03 DIAGNOSIS — J01.10 ACUTE NON-RECURRENT FRONTAL SINUSITIS: Primary | ICD-10-CM

## 2023-11-03 PROCEDURE — G0382 LEV 3 HOSP TYPE B ED VISIT: HCPCS | Performed by: FAMILY MEDICINE

## 2023-11-03 RX ORDER — METHYLPREDNISOLONE 4 MG/1
TABLET ORAL
Qty: 21 TABLET | Refills: 0 | Status: SHIPPED | OUTPATIENT
Start: 2023-11-03

## 2023-11-03 RX ORDER — AZITHROMYCIN 250 MG/1
TABLET, FILM COATED ORAL
Qty: 6 TABLET | Refills: 0 | Status: SHIPPED | OUTPATIENT
Start: 2023-11-03 | End: 2023-11-07

## 2023-11-03 NOTE — PROGRESS NOTES
[facility]        NAME: Tiffanie Patrick is a 56 y.o. male  : 1965    MRN: [id number]  DATE: November 3, 2023  TIME: 7:15 PM    Assessment and Plan   Acute non-recurrent frontal sinusitis [J01.10]  1. Acute non-recurrent frontal sinusitis  azithromycin (ZITHROMAX) 250 mg tablet    methylPREDNISolone 4 MG tablet therapy pack            Patient Instructions       Follow up with PCP in 3-5 days. Proceed to  ER if symptoms worsen. Chief Complaint     Chief Complaint   Patient presents with    Cold Like Symptoms    Cough     Patient states that he has had cold like symptoms for a month. Has phlegm but barely productive. History of Present Illness       22-year-old male with 1 month history of increased nasal congestion, sore throat cough and runny nose. Denies any fevers or chills. Denies any headaches nausea vomiting. Review of Systems   Review of Systems   Constitutional: Negative. HENT:  Positive for congestion. Eyes: Negative. Respiratory:  Positive for cough. Cardiovascular: Negative. Gastrointestinal: Negative. Genitourinary: Negative. Skin: Negative. Allergic/Immunologic: Negative. Neurological: Negative. Hematological: Negative. Psychiatric/Behavioral: Negative.            Current Medications       Current Outpatient Medications:     azithromycin (ZITHROMAX) 250 mg tablet, Take 2 tablets today then 1 tablet daily x 4 days, Disp: 6 tablet, Rfl: 0    methylPREDNISolone 4 MG tablet therapy pack, Use as directed on package, Disp: 21 tablet, Rfl: 0    Current Allergies     Allergies as of 2023    (No Known Allergies)            The following portions of the patient's history were reviewed and updated as appropriate: allergies, current medications, past family history, past medical history, past social history, past surgical history and problem list.     Past Medical History:   Diagnosis Date    Diverticulitis 2016    Perforated sigmoid colon [facility]) 2016       Past Surgical History:   Procedure Laterality Date    COLOSTOMY  08/2016    had colostomy reversal sept. 2016. No family history on file. Medications have been verified. Objective   /94   Pulse 67   Temp 98.6 °F (37 °C) (Tympanic)   Resp 18   SpO2 97%   No LMP for male patient. Physical Exam     Physical Exam  Constitutional:       Appearance: He is well-developed. HENT:      Head: Normocephalic. Nose: Congestion present. Eyes:      Pupils: Pupils are equal, round, and reactive to light. Cardiovascular:      Rate and Rhythm: Normal rate. Pulmonary:      Effort: Pulmonary effort is normal.   Musculoskeletal:         General: Normal range of motion. Cervical back: Normal range of motion. Skin:     General: Skin is warm. Neurological:      Mental Status: He is alert and oriented to person, place, and time.

## 2024-01-02 PROBLEM — J01.10 ACUTE NON-RECURRENT FRONTAL SINUSITIS: Status: RESOLVED | Noted: 2023-11-03 | Resolved: 2024-01-02

## 2025-06-10 ENCOUNTER — APPOINTMENT (OUTPATIENT)
Dept: URGENT CARE | Facility: CLINIC | Age: 60
End: 2025-06-10